# Patient Record
Sex: FEMALE | Race: WHITE
[De-identification: names, ages, dates, MRNs, and addresses within clinical notes are randomized per-mention and may not be internally consistent; named-entity substitution may affect disease eponyms.]

---

## 2017-09-12 NOTE — ER DOCUMENT REPORT
ED Medical Screen (RME)





- General


Chief Complaint: Abdominal Pain


Stated Complaint: ABDOMINAL PAIN


Time Seen by Provider: 09/12/17 14:06


Mode of Arrival: Ambulatory


Information source: Patient


Notes: 





Patient states she has left lower quadrant pain.  It is sharp and severe.  It 

is constant.  She states she has this pain due to an ovarian cyst that she has 

previously been diagnosed with.  No new symptoms.  No vaginal bleeding or 

discharge.  No vomiting or diarrhea.  Nothing makes the pain better or worse.  

The pain does radiate to her back.  No dysuria urgency or frequency.


TRAVEL OUTSIDE OF THE [place] IN LAST 30 DAYS: No





- Related Data


Allergies/Adverse Reactions: 


 





cephalexin [From Keflex] Allergy (Verified 09/12/17 12:51)


 


Penicillins Allergy (Verified 09/12/17 12:51)


 


sulfamethoxazole [From Bactrim] Allergy (Verified 09/12/17 12:51)


 


tomato Allergy (Verified 09/12/17 12:51)


 


trimethoprim [From Bactrim] Allergy (Verified 09/12/17 12:51)


 


ziprasidone [From Geodon] Allergy (Verified 09/12/17 12:51)


 











Past Medical History





- General


Information source: Patient





- Social History


Cigarette use (# per day): No


Chew tobacco use (# tins/day): No


Frequency of alcohol use: None


Drug Abuse: None


Family history: Reviewed & Not Pertinent


Renal/ Medical History: Denies: Hx Peritoneal Dialysis





Review of Systems





- Review of Systems


Constitutional: denies: Chills, Fever


Cardiovascular: denies: Chest pain, Palpitations


Respiratory: denies: Cough, Short of breath


-: Yes All other systems reviewed and negative





Physical Exam





- Vital signs


Vitals: 





 











Temp Pulse Resp BP Pulse Ox


 


 98.0 F   102 H  18   119/101 H  100 


 


 09/12/17 12:49  09/12/17 12:49  09/12/17 12:49  09/12/17 12:49  09/12/17 12:49











Interpretation: Hypertensive





- General


General appearance: Appears well, Alert





- HEENT


Head: Normocephalic, Atraumatic


Eyes: Normal


Pupils: PERRL





- Respiratory


Respiratory status: No respiratory distress


Chest status: Nontender


Breath sounds: Normal


Chest palpation: Normal





- Cardiovascular


Rhythm: Regular


Heart sounds: Normal auscultation


Murmur: No





- Abdominal


Inspection: Normal


Distension: No distension


Bowel sounds: Normal


Tenderness: Tender


Organomegaly: No organomegaly





- Back


Back: Normal, Nontender





- Extremities


General upper extremity: Normal inspection, Nontender, Normal color, Normal ROM

, Normal temperature


General lower extremity: Normal inspection, Nontender, Normal color, Normal ROM

, Normal temperature, Normal weight bearing.  No: Faraz's sign





- Neurological


Neuro grossly intact: Yes


Cognition: Normal


Orientation: AAOx4


[place] Coma Scale Eye Opening: Spontaneous


[place] Coma Scale Verbal: Oriented


[place] Coma Scale Motor: Obeys Commands


[place] Coma Scale Total: 15


Speech: Normal


Motor strength normal: LUE, RUE, LLE, RLE


Sensory: Normal





- Psychological


Associated symptoms: Normal affect, Normal mood





- Skin


Skin Temperature: Warm


Skin Moisture: Dry


Skin Color: Normal





Course





- Vital Signs


Vital signs: 





 











Temp Pulse Resp BP Pulse Ox


 


 98.0 F   102 H  18   119/101 H  100 


 


 09/12/17 12:49  09/12/17 12:49  09/12/17 12:49  09/12/17 12:49  09/12/17 12:49














- Laboratory


Result Diagrams: 


 09/12/17 14:26





 09/12/17 14:26


Laboratory results interpreted by me: 





 











  09/12/17 09/12/17





  12:50 14:26


 


Hgb   8.4 L


 


Hct   28.8 L


 


MCV   64 L


 


MCH   18.6 L


 


MCHC   29.3 L


 


RDW   21.4 H


 


Urine Ketones  TRACE H 


 


Urine Urobilinogen  2.0 H 


 


Urine Ascorbic Acid  20 H 














Doctor's Discharge





- Discharge


Clinical Impression: 


 Acute abdominal pain in left lower quadrant





Condition: Stable


Disposition: HOME, SELF-CARE


Instructions:  Abdominal Pain ([facility]), Oral Narcotic Medication ([facility])


Additional Instructions: 


Please call OB/GYN to arrange follow-up as soon as possible.


Prescriptions: 


Oxycodone HCl/Acetaminophen [Percocet 5-325 mg Tablet] 1 - 2 tab PO Q4H PRN #25 

tablet


 PRN Reason: 


Forms:  Elevated Blood Pressure


Referrals: 


SONY PEDRO MD [ACTIVE STAFF] - Follow up as needed

## 2018-06-14 ENCOUNTER — HOSPITAL ENCOUNTER (EMERGENCY)
Dept: HOSPITAL 62 - ER | Age: 38
Discharge: HOME | End: 2018-06-14
Payer: MEDICARE

## 2018-06-14 VITALS — SYSTOLIC BLOOD PRESSURE: 109 MMHG | DIASTOLIC BLOOD PRESSURE: 86 MMHG

## 2018-06-14 DIAGNOSIS — Z88.0: ICD-10-CM

## 2018-06-14 DIAGNOSIS — J06.9: Primary | ICD-10-CM

## 2018-06-14 DIAGNOSIS — Z88.3: ICD-10-CM

## 2018-06-14 PROCEDURE — 99283 EMERGENCY DEPT VISIT LOW MDM: CPT

## 2018-06-14 NOTE — ER DOCUMENT REPORT
ED General





- General


Chief Complaint: Cough


Stated Complaint: COUGH/DIFFICULTY BREATHING


Time Seen by Provider: 06/14/18 11:35


Notes: 





38-year-old female here with 2 weeks of cough productive of white sputum 

congestion runny nose sore throat shortness of breath wheezing vomiting (only 

after coughing) and diarrhea.  She has been using her pro-air inhaler which has 

helped with the wheezing.  She visited her son in [place] and they were all 6 

that she believes she contracted the illness from them.  Immunizations up-to-

date.  She does not smoke.


TRAVEL OUTSIDE OF THE [place] IN LAST 30 DAYS: No





- Related Data


Allergies/Adverse Reactions: 


 





cephalexin [From Keflex] Allergy (Verified 06/14/18 11:31)


 


Penicillins Allergy (Verified 06/14/18 11:31)


 


sulfamethoxazole [From Bactrim] Allergy (Verified 06/14/18 11:31)


 


tomato Allergy (Verified 06/14/18 11:31)


 


trimethoprim [From Bactrim] Allergy (Verified 06/14/18 11:31)


 


ziprasidone [From Geodon] Allergy (Verified 06/14/18 11:31)


 











Past Medical History





- Social History


Smoking Status: Unknown if Ever Smoked


Family History: Reviewed & Not Pertinent


Renal/ Medical History: Denies: Hx Peritoneal Dialysis





Review of Systems





- Review of Systems


Notes: 





See history of present illness for pertinent positive review of systems; 

otherwise all review of systems have been reviewed and are negative





Physical Exam





- Vital signs


Vitals: 





 











Temp Pulse Resp BP Pulse Ox


 


 97.9 F   82   18   109/86 H  97 


 


 06/14/18 11:33  06/14/18 11:33  06/14/18 11:33  06/14/18 11:33  06/14/18 11:33














- Notes


Notes: 





PHYSICAL EXAMINATION:





GENERAL: Well-appearing and in no acute distress.





HEAD: Atraumatic, normocephalic.





EYES: Pupils equal round and reactive to light, extraocular movements intact, 

sclera anicteric, conjunctiva are normal.





ENT: nares patent, oropharynx clear without exudates.  Moist mucous membranes.  

No tonsillar swelling or exudates





NECK: Normal range of motion, supple without lymphadenopathy





LUNGS: CTAB and equal.  No wheezes rales or rhonchi.





HEART: Regular rate and rhythm without murmurs





ABDOMEN: Soft, no tenderness.  No facial grimacing/wincing upon palpation.  No 

guarding, no rebound.





EXTREMITIES: Normal range of motion, no pitting edema.  No cyanosis.





NEUROLOGICAL: Cranial nerves grossly intact. Normal sensory/motor exams.





PSYCH: Normal mood, normal affect.





SKIN: Warm, Dry, normal turgor, no rashes or lesions noted





Course





- Re-evaluation


Re-evalutation: 





06/14/18 11:40


MEDICAL DECISION MAKING:


Concern for upper respiratory infection, most likely viral


Prednisone/azithromycin for asthma exacerbation and presumed bronchitis


Instructed patient on fever control with Tylenol and/or (if applicable) Motrin


Also discussed keeping hydrated with water or Gatorade/Pedialyte


Instructed follow-up PCP next day or few


Patient understands and agrees to the plan of care





- Vital Signs


Vital signs: 





 











Temp Pulse Resp BP Pulse Ox


 


 97.9 F   82   18   109/86 H  97 


 


 06/14/18 11:33  06/14/18 11:33  06/14/18 11:33  06/14/18 11:33  06/14/18 11:33














Discharge





- Discharge


Clinical Impression: 


 Acute URI





Condition: Good


Disposition: HOME, SELF-CARE


Additional Instructions: 


You were seen in the emergency department at [facility].  Finish 

the steroids and antibiotics for respiratory infection.  Use the pro-air as 

needed for wheezing shortness of breath.  Please followup with your primary 

physician in the next few days for further management/evaluation.  Please 

return to the emergency department for worsening of symptoms or any symptom 

that you deem to be concerning or life-threatening.  Thank you for allowing us 

to be part of your care.


Prescriptions: 


Azithromycin [Zithromax 250 mg Tablet] 250 mg PO ASDIR PRN #6 tablet


 PRN Reason: 


Prednisone [Deltasone 20 mg Tablet] 3 tab PO DAILY 5 Days  tablet

## 2018-06-21 ENCOUNTER — HOSPITAL ENCOUNTER (EMERGENCY)
Dept: HOSPITAL 62 - ER | Age: 38
LOS: 1 days | Discharge: HOME | End: 2018-06-22
Payer: MEDICARE

## 2018-06-21 DIAGNOSIS — J45.909: ICD-10-CM

## 2018-06-21 DIAGNOSIS — R10.2: ICD-10-CM

## 2018-06-21 DIAGNOSIS — R10.9: ICD-10-CM

## 2018-06-21 DIAGNOSIS — N80.9: Primary | ICD-10-CM

## 2018-06-21 PROCEDURE — 72192 CT PELVIS W/O DYE: CPT

## 2018-06-21 PROCEDURE — 93976 VASCULAR STUDY: CPT

## 2018-06-21 PROCEDURE — 99284 EMERGENCY DEPT VISIT MOD MDM: CPT

## 2018-06-21 PROCEDURE — 76857 US EXAM PELVIC LIMITED: CPT

## 2018-06-21 NOTE — RADIOLOGY REPORT (SQ)
EXAM DESCRIPTION:

CT PELVIS WITHOUT IV CONTRAST



COMPLETED DATE/TME:  06/21/2018 23:08



CLINICAL HISTORY:

38 years Female, PELV. PAIN; H/O OV. CYSTS; OVARIES NON-VIS ON

U/S



Comparison: Ultrasound, same day



Technique:  No contrast.  Coronal and sagittal reformat.  This

exam was performed according to our departmental

dose-optimization program, which includes automated exposure

control, adjustment of the mA and/or kV according to patient size

and/or use of iterative reconstruction technique.[facility]: Dose Right

[facility]: CareDose   [facility]: Dose Right    [facility]: Teradose 4D    [facility]:

[employer]



LIMITATIONS:

None



Findings: 



No free fluid. Normal appendix. CT appearance of the ovaries

appear within normal limits, as queried. Unenhanced pelvic

structures, and musculoskeleton appear otherwise grossly

unremarkable.



Impression:  No acute findings.

## 2018-06-21 NOTE — ER DOCUMENT REPORT
ED GI/





- General


Chief Complaint: Abdominal Pain


Stated Complaint: ABDOMINAL PAIN, PELVIC PAIN


Time Seen by Provider: 18 20:38


Mode of Arrival: Ambulatory


Information source: Patient


TRAVEL OUTSIDE OF THE [place] IN LAST 30 DAYS: No





- HPI


Patient complains to provider of: Abdominal pain, Pelvic pain


Onset: This afternoon


Timing/Duration: Gradual, Constant, Persistent


Quality of pain: Dull


Severity at maximum: Moderate


Severity in ED: Moderate


Context: denies: Bad food, Lifting, Out of the country travel, Pregnant, Recent 

trauma


Location: LLQ - LESS, RLQ - MORE


Vaginal bleeding (Compared to normal period): Spotting


Menstrual period history: denies: Missed





- Related Data


Allergies/Adverse Reactions: 


 





cephalexin [From Keflex] Allergy (Verified 18 11:31)


 


Penicillins Allergy (Verified 18 11:31)


 


sulfamethoxazole [From Bactrim] Allergy (Verified 18 11:31)


 


tomato Allergy (Verified 18 11:31)


 


trimethoprim [From Bactrim] Allergy (Verified 18 11:31)


 


ziprasidone [From Geodon] Allergy (Verified 18 11:31)


 











Past Medical History





- General


Information source: Patient





- Social History


Smoking Status: Never Smoker


Cigarette use (# per day): No


Chew tobacco use (# tins/day): No


Frequency of alcohol use: None


Drug Abuse: None


Lives with: Spouse/Significant other


Family History: Reviewed & Not Pertinent


Patient has suicidal ideation: No


Patient has homicidal ideation: No





- Past Medical History


Cardiac Medical History: Reports: None


Pulmonary Medical History: Reports: Hx Asthma


EENT Medical History: Reports: None


Neurological Medical History: Reports: None


Endocrine Medical History: Reports: None


Renal/ Medical History: Reports: Hx Ovarian Cysts - BILATERAL, Other - 

ENDOMETRIOSIS.  Denies: Hx Peritoneal Dialysis


Malignancy Medical History: Reports: None


GI Medical History: Reports: None


Musculoskeltal Medical History: Reports None


Psychiatric Medical History: Reports: Hx Bipolar Disorder


Past Surgical History: Reports: Hx  Section, Hx Tubal Ligation





Review of Systems





- Review of Systems


Constitutional: No symptoms reported.  denies: Chills, Fever


EENT: No symptoms reported


Cardiovascular: No symptoms reported


Respiratory: No symptoms reported


Gastrointestinal: No symptoms reported


Genitourinary: No symptoms reported


Female Genitourinary: See HPI


Musculoskeletal: No symptoms reported


Skin: No symptoms reported


Neurological/Psychological: No symptoms reported





Physical Exam





- Vital signs


Vitals: 


 











Temp Pulse Resp BP Pulse Ox


 


 98.0 F   77   18   112/88 H  99 


 


 18 20:20  18 20:20  18 20:20  18 20:20  18 20:20











Interpretation: Normal.  No: Tachycardic, Tachypneic





- General


General appearance: Appears well, Alert


In distress: None





- HEENT


Head: Normocephalic


Eyes: Normal


Conjunctiva: Normal


Ears: Normal


Nasal: Normal


Mouth/Lips: Normal


Mucous membranes: Normal





- Respiratory


Respiratory status: No respiratory distress





- Cardiovascular


Rhythm: Regular





- Abdominal


Inspection: Obese





- Back


Back: Normal





- Extremities


General upper extremity: Normal inspection


General lower extremity: Normal inspection.  No: Tender, Edema





- Neurological


Neuro grossly intact: Yes


Cognition: Normal


Orientation: AAOx4





- Psychological


Associated symptoms: Normal affect, Normal mood





- Skin


Skin Temperature: Warm


Skin Moisture: Dry


Skin Color: Normal


Skin Turgor: Elastic





Course





- Re-evaluation


Re-evalutation: 





18 00:06


Patient reports pain is improved.  Results of imaging studies discussed.  

Treatment strategies discussed.





- Vital Signs


Vital signs: 


 











Temp Pulse Resp BP Pulse Ox


 


 98.0 F   77   18   112/88 H  99 


 


 18 20:20  18 20:20  18 20:20  18 20:20  18 20:20














Discharge





- Discharge


Clinical Impression: 


 Pelvic pain, Endometriosis





Condition: Stable


Disposition: HOME, SELF-CARE


Instructions:  Endometriosis ([facility]), Antinausea Medication ([facility]), Oral Narcotic 

Medication ([facility])


Additional Instructions: 


REST, DRINK PLENTY OF FLUIDS.


MEDS AS DIRECTED.


FOLLOW UP WITH OB-GYN CLINIC, CALL TOMORROW A.M. FOR APPT.


RETURN TO E.R. IF YOU GET WORSE.

## 2018-06-21 NOTE — RADIOLOGY REPORT (SQ)
EXAM DESCRIPTION:  U/S NON OB PEL LTD W/DOPPLER



COMPLETED DATE/TIME:  6/21/2018 9:54 pm



REASON FOR STUDY:  PELVIC PAIN, H/O OVARIAN CYST, H/O ENDOMETRIOSIS



COMPARISON:  None.



TECHNIQUE:  Dynamic and static grayscale images acquired of the pelvis via transvaginal and transabdo
german approach and recorded on PACS. Additional selected color Doppler and spectral images recorded.



LIMITATIONS:  None.



FINDINGS:  UTERUS: Retroverted.  Contour normal.  No mass.

ENDOMETRIAL STRIPE: No focal or generalized thickening. No masses.

CERVIX: Small nabothian cysts.

RIGHT OVARY AND DOPPLER: Ovary not visualized.

LEFT OVARY AND DOPPLER: Ovary not visualized.

FREE FLUID: None noted.

OTHER: No other significant finding.

MEASUREMENTS:

UTERUS: 3.8 x 4.5 x 9.1 cm.

ENDOMETRIAL STRIPE: 7 mm.

RIGHT OVARY: Not visualized.

LEFT OVARY: Not visualized.



IMPRESSION:  UNABLE TO VISUALIZE THE OVARIES DUE TO OVERLYING BOWEL GAS.  UNREMARKABLE APPEARANCE OF 
THE UTERUS.



TECHNICAL DOCUMENTATION:  JOB ID:  [id number]

 2011 [employer]- All Rights Reserved                           Rev-[id number]



Reading location - IP/workstation name: [email or web address]

## 2018-06-22 VITALS — DIASTOLIC BLOOD PRESSURE: 81 MMHG | SYSTOLIC BLOOD PRESSURE: 110 MMHG
